# Patient Record
Sex: FEMALE | Race: WHITE | NOT HISPANIC OR LATINO | URBAN - METROPOLITAN AREA
[De-identification: names, ages, dates, MRNs, and addresses within clinical notes are randomized per-mention and may not be internally consistent; named-entity substitution may affect disease eponyms.]

---

## 2017-07-03 ENCOUNTER — CONVERSION ENCOUNTER (OUTPATIENT)
Dept: GENERAL RADIOLOGY | Facility: HOSPITAL | Age: 67
End: 2017-07-03

## 2018-01-04 ENCOUNTER — OFFICE VISIT CONVERTED (OUTPATIENT)
Dept: FAMILY MEDICINE CLINIC | Facility: CLINIC | Age: 68
End: 2018-01-04
Attending: NURSE PRACTITIONER

## 2018-02-22 ENCOUNTER — OFFICE VISIT CONVERTED (OUTPATIENT)
Dept: FAMILY MEDICINE CLINIC | Facility: CLINIC | Age: 68
End: 2018-02-22
Attending: NURSE PRACTITIONER

## 2018-04-10 ENCOUNTER — CONVERSION ENCOUNTER (OUTPATIENT)
Dept: FAMILY MEDICINE CLINIC | Facility: CLINIC | Age: 68
End: 2018-04-10

## 2018-04-10 ENCOUNTER — OFFICE VISIT CONVERTED (OUTPATIENT)
Dept: FAMILY MEDICINE CLINIC | Facility: CLINIC | Age: 68
End: 2018-04-10
Attending: NURSE PRACTITIONER

## 2018-04-25 ENCOUNTER — OFFICE VISIT CONVERTED (OUTPATIENT)
Dept: FAMILY MEDICINE CLINIC | Facility: CLINIC | Age: 68
End: 2018-04-25
Attending: NURSE PRACTITIONER

## 2018-05-09 ENCOUNTER — OFFICE VISIT CONVERTED (OUTPATIENT)
Dept: FAMILY MEDICINE CLINIC | Facility: CLINIC | Age: 68
End: 2018-05-09
Attending: NURSE PRACTITIONER

## 2018-06-22 ENCOUNTER — OFFICE VISIT CONVERTED (OUTPATIENT)
Dept: FAMILY MEDICINE CLINIC | Facility: CLINIC | Age: 68
End: 2018-06-22
Attending: NURSE PRACTITIONER

## 2018-08-08 ENCOUNTER — OFFICE VISIT CONVERTED (OUTPATIENT)
Dept: FAMILY MEDICINE CLINIC | Facility: CLINIC | Age: 68
End: 2018-08-08
Attending: NURSE PRACTITIONER

## 2018-08-14 ENCOUNTER — OFFICE VISIT CONVERTED (OUTPATIENT)
Dept: SURGERY | Facility: CLINIC | Age: 68
End: 2018-08-14
Attending: SURGERY

## 2018-08-29 ENCOUNTER — CONVERSION ENCOUNTER (OUTPATIENT)
Dept: GENERAL RADIOLOGY | Facility: HOSPITAL | Age: 68
End: 2018-08-29

## 2018-09-13 ENCOUNTER — OFFICE VISIT CONVERTED (OUTPATIENT)
Dept: SURGERY | Facility: CLINIC | Age: 68
End: 2018-09-13
Attending: SURGERY

## 2018-09-13 ENCOUNTER — CONVERSION ENCOUNTER (OUTPATIENT)
Dept: SURGERY | Facility: CLINIC | Age: 68
End: 2018-09-13

## 2018-09-19 ENCOUNTER — CONVERSION ENCOUNTER (OUTPATIENT)
Dept: FAMILY MEDICINE CLINIC | Facility: CLINIC | Age: 68
End: 2018-09-19

## 2018-09-19 ENCOUNTER — OFFICE VISIT CONVERTED (OUTPATIENT)
Dept: FAMILY MEDICINE CLINIC | Facility: CLINIC | Age: 68
End: 2018-09-19
Attending: NURSE PRACTITIONER

## 2018-10-04 ENCOUNTER — CONVERSION ENCOUNTER (OUTPATIENT)
Dept: SURGERY | Facility: CLINIC | Age: 68
End: 2018-10-04

## 2018-10-04 ENCOUNTER — OFFICE VISIT CONVERTED (OUTPATIENT)
Dept: SURGERY | Facility: CLINIC | Age: 68
End: 2018-10-04
Attending: SURGERY

## 2018-10-11 ENCOUNTER — OFFICE VISIT CONVERTED (OUTPATIENT)
Dept: SURGERY | Facility: CLINIC | Age: 68
End: 2018-10-11
Attending: SURGERY

## 2018-10-11 ENCOUNTER — CONVERSION ENCOUNTER (OUTPATIENT)
Dept: SURGERY | Facility: CLINIC | Age: 68
End: 2018-10-11

## 2018-11-12 ENCOUNTER — OFFICE VISIT CONVERTED (OUTPATIENT)
Dept: SURGERY | Facility: CLINIC | Age: 68
End: 2018-11-12
Attending: SURGERY

## 2018-11-12 ENCOUNTER — CONVERSION ENCOUNTER (OUTPATIENT)
Dept: SURGERY | Facility: CLINIC | Age: 68
End: 2018-11-12

## 2019-03-21 ENCOUNTER — OFFICE VISIT CONVERTED (OUTPATIENT)
Dept: SURGERY | Facility: CLINIC | Age: 69
End: 2019-03-21
Attending: SURGERY

## 2019-03-21 ENCOUNTER — CONVERSION ENCOUNTER (OUTPATIENT)
Dept: SURGERY | Facility: CLINIC | Age: 69
End: 2019-03-21

## 2019-09-26 ENCOUNTER — OFFICE VISIT CONVERTED (OUTPATIENT)
Dept: FAMILY MEDICINE CLINIC | Facility: CLINIC | Age: 69
End: 2019-09-26
Attending: NURSE PRACTITIONER

## 2019-10-15 ENCOUNTER — CONVERSION ENCOUNTER (OUTPATIENT)
Dept: FAMILY MEDICINE CLINIC | Facility: CLINIC | Age: 69
End: 2019-10-15

## 2019-10-15 ENCOUNTER — CONVERSION ENCOUNTER (OUTPATIENT)
Dept: SURGERY | Facility: CLINIC | Age: 69
End: 2019-10-15

## 2019-10-15 ENCOUNTER — OFFICE VISIT CONVERTED (OUTPATIENT)
Dept: SURGERY | Facility: CLINIC | Age: 69
End: 2019-10-15
Attending: SURGERY

## 2019-10-28 ENCOUNTER — HOSPITAL ENCOUNTER (OUTPATIENT)
Dept: GASTROENTEROLOGY | Facility: HOSPITAL | Age: 69
Setting detail: HOSPITAL OUTPATIENT SURGERY
Discharge: HOME | End: 2019-10-28
Attending: SURGERY

## 2019-10-28 LAB — GLUCOSE BLD-MCNC: 199 MG/DL (ref 65–99)

## 2019-11-11 ENCOUNTER — CONVERSION ENCOUNTER (OUTPATIENT)
Dept: SURGERY | Facility: CLINIC | Age: 69
End: 2019-11-11

## 2019-11-11 ENCOUNTER — OFFICE VISIT CONVERTED (OUTPATIENT)
Dept: SURGERY | Facility: CLINIC | Age: 69
End: 2019-11-11
Attending: NURSE PRACTITIONER

## 2019-11-12 ENCOUNTER — HOSPITAL ENCOUNTER (OUTPATIENT)
Dept: MAMMOGRAPHY | Facility: HOSPITAL | Age: 69
Discharge: HOME | End: 2019-11-12
Attending: NURSE PRACTITIONER

## 2020-02-06 ENCOUNTER — HOSPITAL ENCOUNTER (OUTPATIENT)
Dept: LAB | Facility: HOSPITAL | Age: 70
Discharge: HOME | End: 2020-02-06
Attending: NURSE PRACTITIONER

## 2020-02-06 LAB
25(OH)D3 SERPL-MCNC: 22.4 NG/ML (ref 30–100)
ALBUMIN SERPL-MCNC: 3.9 G/DL (ref 3.5–5)
ALBUMIN/GLOB SERPL: 1.3 {RATIO} (ref 1.4–2.6)
ALP SERPL-CCNC: 96 U/L (ref 43–160)
ALT SERPL-CCNC: 11 U/L (ref 10–40)
ANION GAP SERPL CALC-SCNC: 17 MMOL/L (ref 8–19)
APPEARANCE UR: CLEAR
AST SERPL-CCNC: 11 U/L (ref 15–50)
BASOPHILS # BLD AUTO: 0.04 10*3/UL (ref 0–0.2)
BASOPHILS NFR BLD AUTO: 0.5 % (ref 0–3)
BILIRUB SERPL-MCNC: 0.34 MG/DL (ref 0.2–1.3)
BILIRUB UR QL: NEGATIVE
BUN SERPL-MCNC: 29 MG/DL (ref 5–25)
BUN/CREAT SERPL: 24 {RATIO} (ref 6–20)
CALCIUM SERPL-MCNC: 9.4 MG/DL (ref 8.7–10.4)
CHLORIDE SERPL-SCNC: 110 MMOL/L (ref 99–111)
CHOLEST SERPL-MCNC: 156 MG/DL (ref 107–200)
CHOLEST/HDLC SERPL: 4.9 {RATIO} (ref 3–6)
COLOR UR: YELLOW
CONV ABS IMM GRAN: 0.02 10*3/UL (ref 0–0.2)
CONV BACTERIA: NEGATIVE
CONV CO2: 19 MMOL/L (ref 22–32)
CONV COLLECTION SOURCE (UA): ABNORMAL
CONV CREATININE URINE, RANDOM: 118 MG/DL (ref 10–300)
CONV HYALINE CASTS IN URINE MICRO: ABNORMAL /[LPF]
CONV IMMATURE GRAN: 0.3 % (ref 0–1.8)
CONV MICROALBUM.,U,RANDOM: 946.3 MG/L (ref 0–20)
CONV TOTAL PROTEIN: 6.8 G/DL (ref 6.3–8.2)
CONV UROBILINOGEN IN URINE BY AUTOMATED TEST STRIP: 1 {EHRLICHU}/DL (ref 0.1–1)
CREAT UR-MCNC: 1.2 MG/DL (ref 0.5–0.9)
DEPRECATED RDW RBC AUTO: 45.1 FL (ref 36.4–46.3)
EOSINOPHIL # BLD AUTO: 0.37 10*3/UL (ref 0–0.7)
EOSINOPHIL # BLD AUTO: 5.1 % (ref 0–7)
ERYTHROCYTE [DISTWIDTH] IN BLOOD BY AUTOMATED COUNT: 14.3 % (ref 11.7–14.4)
EST. AVERAGE GLUCOSE BLD GHB EST-MCNC: 260 MG/DL
GFR SERPLBLD BASED ON 1.73 SQ M-ARVRAT: 45 ML/MIN/{1.73_M2}
GLOBULIN UR ELPH-MCNC: 2.9 G/DL (ref 2–3.5)
GLUCOSE SERPL-MCNC: 125 MG/DL (ref 65–99)
GLUCOSE UR QL: NEGATIVE MG/DL
HBA1C MFR BLD: 10.7 % (ref 3.5–5.7)
HCT VFR BLD AUTO: 37.5 % (ref 37–47)
HDLC SERPL-MCNC: 32 MG/DL (ref 40–60)
HGB BLD-MCNC: 11.7 G/DL (ref 12–16)
HGB UR QL STRIP: NEGATIVE
KETONES UR QL STRIP: NEGATIVE MG/DL
LDLC SERPL CALC-MCNC: 90 MG/DL (ref 70–100)
LEUKOCYTE ESTERASE UR QL STRIP: ABNORMAL
LYMPHOCYTES # BLD AUTO: 1.66 10*3/UL (ref 1–5)
LYMPHOCYTES NFR BLD AUTO: 22.7 % (ref 20–45)
MCH RBC QN AUTO: 27 PG (ref 27–31)
MCHC RBC AUTO-ENTMCNC: 31.2 G/DL (ref 33–37)
MCV RBC AUTO: 86.6 FL (ref 81–99)
MICROALBUMIN/CREAT UR: 801.9 MG/G{CRE} (ref 0–35)
MONOCYTES # BLD AUTO: 0.57 10*3/UL (ref 0.2–1.2)
MONOCYTES NFR BLD AUTO: 7.8 % (ref 3–10)
NEUTROPHILS # BLD AUTO: 4.66 10*3/UL (ref 2–8)
NEUTROPHILS NFR BLD AUTO: 63.6 % (ref 30–85)
NITRITE UR QL STRIP: NEGATIVE
NRBC CBCN: 0 % (ref 0–0.7)
OSMOLALITY SERPL CALC.SUM OF ELEC: 299 MOSM/KG (ref 273–304)
PH UR STRIP.AUTO: 5 [PH] (ref 5–8)
PLATELET # BLD AUTO: 200 10*3/UL (ref 130–400)
PMV BLD AUTO: 12.3 FL (ref 9.4–12.3)
POTASSIUM SERPL-SCNC: 4.7 MMOL/L (ref 3.5–5.3)
PROT UR QL: 30 MG/DL
RBC # BLD AUTO: 4.33 10*6/UL (ref 4.2–5.4)
RBC #/AREA URNS HPF: ABNORMAL /[HPF]
SODIUM SERPL-SCNC: 141 MMOL/L (ref 135–147)
SP GR UR: 1.02 (ref 1–1.03)
SQUAMOUS SPT QL MICRO: ABNORMAL /[HPF]
T4 FREE SERPL-MCNC: 1.2 NG/DL (ref 0.9–1.8)
TRIGL SERPL-MCNC: 171 MG/DL (ref 40–150)
TSH SERPL-ACNC: 0.76 M[IU]/L (ref 0.27–4.2)
VLDLC SERPL-MCNC: 34 MG/DL (ref 5–37)
WBC # BLD AUTO: 7.32 10*3/UL (ref 4.8–10.8)
WBC #/AREA URNS HPF: ABNORMAL /[HPF]

## 2020-02-09 LAB — BACTERIA UR CULT: NORMAL

## 2020-02-11 ENCOUNTER — OFFICE VISIT CONVERTED (OUTPATIENT)
Dept: FAMILY MEDICINE CLINIC | Facility: CLINIC | Age: 70
End: 2020-02-11
Attending: NURSE PRACTITIONER

## 2020-02-11 ENCOUNTER — CONVERSION ENCOUNTER (OUTPATIENT)
Dept: FAMILY MEDICINE CLINIC | Facility: CLINIC | Age: 70
End: 2020-02-11

## 2020-05-20 ENCOUNTER — HOSPITAL ENCOUNTER (OUTPATIENT)
Dept: LAB | Facility: HOSPITAL | Age: 70
Discharge: HOME | End: 2020-05-20
Attending: SPECIALIST

## 2020-05-20 LAB
25(OH)D3 SERPL-MCNC: 20.1 NG/ML (ref 30–100)
ALBUMIN SERPL-MCNC: 4.1 G/DL (ref 3.5–5)
ALBUMIN/GLOB SERPL: 1.4 {RATIO} (ref 1.4–2.6)
ALP SERPL-CCNC: 109 U/L (ref 43–160)
ALT SERPL-CCNC: 13 U/L (ref 10–40)
ANION GAP SERPL CALC-SCNC: 20 MMOL/L (ref 8–19)
APPEARANCE UR: CLEAR
AST SERPL-CCNC: 14 U/L (ref 15–50)
BASOPHILS # BLD AUTO: 0.06 10*3/UL (ref 0–0.2)
BASOPHILS NFR BLD AUTO: 0.8 % (ref 0–3)
BILIRUB SERPL-MCNC: 0.5 MG/DL (ref 0.2–1.3)
BILIRUB UR QL: NEGATIVE
BUN SERPL-MCNC: 25 MG/DL (ref 5–25)
BUN/CREAT SERPL: 19 {RATIO} (ref 6–20)
CALCIUM SERPL-MCNC: 9.5 MG/DL (ref 8.7–10.4)
CHLORIDE SERPL-SCNC: 106 MMOL/L (ref 99–111)
CHOLEST SERPL-MCNC: 152 MG/DL (ref 107–200)
CHOLEST/HDLC SERPL: 4 {RATIO} (ref 3–6)
COLOR UR: YELLOW
CONV ABS IMM GRAN: 0.02 10*3/UL (ref 0–0.2)
CONV BACTERIA: NEGATIVE
CONV CO2: 18 MMOL/L (ref 22–32)
CONV COLLECTION SOURCE (UA): ABNORMAL
CONV CREATININE URINE, RANDOM: 73.7 MG/DL (ref 10–300)
CONV IMMATURE GRAN: 0.3 % (ref 0–1.8)
CONV MICROALBUM.,U,RANDOM: 165.5 MG/L (ref 0–20)
CONV TOTAL PROTEIN: 7.1 G/DL (ref 6.3–8.2)
CONV UROBILINOGEN IN URINE BY AUTOMATED TEST STRIP: 1 {EHRLICHU}/DL (ref 0.1–1)
CREAT UR-MCNC: 1.35 MG/DL (ref 0.5–0.9)
DEPRECATED RDW RBC AUTO: 45.6 FL (ref 36.4–46.3)
EOSINOPHIL # BLD AUTO: 0.44 10*3/UL (ref 0–0.7)
EOSINOPHIL # BLD AUTO: 5.9 % (ref 0–7)
ERYTHROCYTE [DISTWIDTH] IN BLOOD BY AUTOMATED COUNT: 14.4 % (ref 11.7–14.4)
EST. AVERAGE GLUCOSE BLD GHB EST-MCNC: 209 MG/DL
GFR SERPLBLD BASED ON 1.73 SQ M-ARVRAT: 39 ML/MIN/{1.73_M2}
GLOBULIN UR ELPH-MCNC: 3 G/DL (ref 2–3.5)
GLUCOSE SERPL-MCNC: 181 MG/DL (ref 65–99)
GLUCOSE UR QL: NEGATIVE MG/DL
HBA1C MFR BLD: 8.9 % (ref 3.5–5.7)
HCT VFR BLD AUTO: 39.9 % (ref 37–47)
HDLC SERPL-MCNC: 38 MG/DL (ref 40–60)
HGB BLD-MCNC: 12.4 G/DL (ref 12–16)
HGB UR QL STRIP: NEGATIVE
KETONES UR QL STRIP: NEGATIVE MG/DL
LDLC SERPL CALC-MCNC: 57 MG/DL (ref 70–100)
LEUKOCYTE ESTERASE UR QL STRIP: NEGATIVE
LYMPHOCYTES # BLD AUTO: 1.67 10*3/UL (ref 1–5)
LYMPHOCYTES NFR BLD AUTO: 22.5 % (ref 20–45)
MCH RBC QN AUTO: 27.1 PG (ref 27–31)
MCHC RBC AUTO-ENTMCNC: 31.1 G/DL (ref 33–37)
MCV RBC AUTO: 87.1 FL (ref 81–99)
MICROALBUMIN/CREAT UR: 224.6 MG/G{CRE} (ref 0–35)
MONOCYTES # BLD AUTO: 0.48 10*3/UL (ref 0.2–1.2)
MONOCYTES NFR BLD AUTO: 6.5 % (ref 3–10)
NEUTROPHILS # BLD AUTO: 4.76 10*3/UL (ref 2–8)
NEUTROPHILS NFR BLD AUTO: 64 % (ref 30–85)
NITRITE UR QL STRIP: NEGATIVE
NRBC CBCN: 0 % (ref 0–0.7)
OSMOLALITY SERPL CALC.SUM OF ELEC: 297 MOSM/KG (ref 273–304)
PH UR STRIP.AUTO: 5 [PH] (ref 5–8)
PLATELET # BLD AUTO: 202 10*3/UL (ref 130–400)
PMV BLD AUTO: 11.8 FL (ref 9.4–12.3)
POTASSIUM SERPL-SCNC: 5.4 MMOL/L (ref 3.5–5.3)
PROT UR QL: 30 MG/DL
RBC # BLD AUTO: 4.58 10*6/UL (ref 4.2–5.4)
RBC #/AREA URNS HPF: ABNORMAL /[HPF]
SODIUM SERPL-SCNC: 139 MMOL/L (ref 135–147)
SP GR UR: 1.01 (ref 1–1.03)
T4 FREE SERPL-MCNC: 1.3 NG/DL (ref 0.9–1.8)
TRIGL SERPL-MCNC: 287 MG/DL (ref 40–150)
TSH SERPL-ACNC: 0.26 M[IU]/L (ref 0.27–4.2)
VLDLC SERPL-MCNC: 57 MG/DL (ref 5–37)
WBC # BLD AUTO: 7.43 10*3/UL (ref 4.8–10.8)
WBC #/AREA URNS HPF: ABNORMAL /[HPF]

## 2020-06-02 ENCOUNTER — OFFICE VISIT CONVERTED (OUTPATIENT)
Dept: FAMILY MEDICINE CLINIC | Facility: CLINIC | Age: 70
End: 2020-06-02
Attending: NURSE PRACTITIONER

## 2020-06-02 ENCOUNTER — HOSPITAL ENCOUNTER (OUTPATIENT)
Dept: FAMILY MEDICINE CLINIC | Facility: CLINIC | Age: 70
Discharge: HOME | End: 2020-06-02
Attending: NURSE PRACTITIONER

## 2020-06-02 ENCOUNTER — CONVERSION ENCOUNTER (OUTPATIENT)
Dept: FAMILY MEDICINE CLINIC | Facility: CLINIC | Age: 70
End: 2020-06-02

## 2020-06-03 LAB
ANION GAP SERPL CALC-SCNC: 17 MMOL/L (ref 8–19)
BUN SERPL-MCNC: 24 MG/DL (ref 5–25)
BUN/CREAT SERPL: 17 {RATIO} (ref 6–20)
CALCIUM SERPL-MCNC: 9.2 MG/DL (ref 8.7–10.4)
CHLORIDE SERPL-SCNC: 105 MMOL/L (ref 99–111)
CONV CO2: 20 MMOL/L (ref 22–32)
CREAT UR-MCNC: 1.41 MG/DL (ref 0.5–0.9)
GFR SERPLBLD BASED ON 1.73 SQ M-ARVRAT: 37 ML/MIN/{1.73_M2}
GLUCOSE SERPL-MCNC: 323 MG/DL (ref 65–99)
OSMOLALITY SERPL CALC.SUM OF ELEC: 297 MOSM/KG (ref 273–304)
POTASSIUM SERPL-SCNC: 6.5 MMOL/L (ref 3.5–5.3)
SODIUM SERPL-SCNC: 135 MMOL/L (ref 135–147)
T4 FREE SERPL-MCNC: 1.5 NG/DL (ref 0.9–1.8)
TSH SERPL-ACNC: 0.25 M[IU]/L (ref 0.27–4.2)

## 2020-06-05 ENCOUNTER — HOSPITAL ENCOUNTER (OUTPATIENT)
Dept: LAB | Facility: HOSPITAL | Age: 70
Discharge: HOME | End: 2020-06-05
Attending: NURSE PRACTITIONER

## 2020-06-05 LAB
ANION GAP SERPL CALC-SCNC: 19 MMOL/L (ref 8–19)
BUN SERPL-MCNC: 21 MG/DL (ref 5–25)
BUN/CREAT SERPL: 17 {RATIO} (ref 6–20)
CALCIUM SERPL-MCNC: 9.4 MG/DL (ref 8.7–10.4)
CHLORIDE SERPL-SCNC: 104 MMOL/L (ref 99–111)
CONV CO2: 20 MMOL/L (ref 22–32)
CREAT UR-MCNC: 1.22 MG/DL (ref 0.5–0.9)
GFR SERPLBLD BASED ON 1.73 SQ M-ARVRAT: 44 ML/MIN/{1.73_M2}
GLUCOSE SERPL-MCNC: 217 MG/DL (ref 65–99)
OSMOLALITY SERPL CALC.SUM OF ELEC: 294 MOSM/KG (ref 273–304)
POTASSIUM SERPL-SCNC: 6.2 MMOL/L (ref 3.5–5.3)
SODIUM SERPL-SCNC: 137 MMOL/L (ref 135–147)

## 2020-06-12 ENCOUNTER — HOSPITAL ENCOUNTER (OUTPATIENT)
Dept: FAMILY MEDICINE CLINIC | Facility: CLINIC | Age: 70
Discharge: HOME | End: 2020-06-12
Attending: NURSE PRACTITIONER

## 2020-06-12 LAB
ANION GAP SERPL CALC-SCNC: 17 MMOL/L (ref 8–19)
BUN SERPL-MCNC: 30 MG/DL (ref 5–25)
BUN/CREAT SERPL: 19 {RATIO} (ref 6–20)
CALCIUM SERPL-MCNC: 9.4 MG/DL (ref 8.7–10.4)
CHLORIDE SERPL-SCNC: 100 MMOL/L (ref 99–111)
CONV CO2: 24 MMOL/L (ref 22–32)
CREAT UR-MCNC: 1.54 MG/DL (ref 0.5–0.9)
GFR SERPLBLD BASED ON 1.73 SQ M-ARVRAT: 33 ML/MIN/{1.73_M2}
GLUCOSE SERPL-MCNC: 199 MG/DL (ref 65–99)
OSMOLALITY SERPL CALC.SUM OF ELEC: 292 MOSM/KG (ref 273–304)
POTASSIUM SERPL-SCNC: 5.5 MMOL/L (ref 3.5–5.3)
SODIUM SERPL-SCNC: 135 MMOL/L (ref 135–147)

## 2020-06-17 ENCOUNTER — HOSPITAL ENCOUNTER (OUTPATIENT)
Dept: FAMILY MEDICINE CLINIC | Facility: CLINIC | Age: 70
Discharge: HOME | End: 2020-06-17
Attending: NURSE PRACTITIONER

## 2020-06-17 ENCOUNTER — OFFICE VISIT CONVERTED (OUTPATIENT)
Dept: FAMILY MEDICINE CLINIC | Facility: CLINIC | Age: 70
End: 2020-06-17
Attending: NURSE PRACTITIONER

## 2020-06-17 LAB
ANION GAP SERPL CALC-SCNC: 21 MMOL/L (ref 8–19)
BUN SERPL-MCNC: 50 MG/DL (ref 5–25)
BUN/CREAT SERPL: 29 {RATIO} (ref 6–20)
CALCIUM SERPL-MCNC: 9.4 MG/DL (ref 8.7–10.4)
CHLORIDE SERPL-SCNC: 99 MMOL/L (ref 99–111)
CONV CO2: 22 MMOL/L (ref 22–32)
CREAT UR-MCNC: 1.73 MG/DL (ref 0.5–0.9)
GFR SERPLBLD BASED ON 1.73 SQ M-ARVRAT: 29 ML/MIN/{1.73_M2}
GLUCOSE SERPL-MCNC: 216 MG/DL (ref 65–99)
OSMOLALITY SERPL CALC.SUM OF ELEC: 304 MOSM/KG (ref 273–304)
POTASSIUM SERPL-SCNC: 4.8 MMOL/L (ref 3.5–5.3)
SODIUM SERPL-SCNC: 137 MMOL/L (ref 135–147)

## 2020-07-01 ENCOUNTER — TELEPHONE CONVERTED (OUTPATIENT)
Dept: FAMILY MEDICINE CLINIC | Facility: CLINIC | Age: 70
End: 2020-07-01
Attending: NURSE PRACTITIONER

## 2020-07-16 ENCOUNTER — HOSPITAL ENCOUNTER (OUTPATIENT)
Dept: LAB | Facility: HOSPITAL | Age: 70
Discharge: HOME | End: 2020-07-16
Attending: INTERNAL MEDICINE

## 2020-07-20 LAB
CONV IMMUNOGLOBULIN G (IGG): 974 MG/DL (ref 586–1602)
CONV IMMUNOGLOBULIN M (IGM): 90 MG/DL (ref 26–217)
IGA SERPL-MCNC: 216 MG/DL (ref 64–422)
PROT PATTERN SERPL IFE-IMP: NORMAL

## 2020-07-21 LAB
ALBUMIN 24H MFR UR ELPH: 70.5 %
ALPHA1 GLOB 24H MFR UR ELPH: 3.3 %
ALPHA2 GLOB 24H MFR UR ELPH: 6.1 %
B-GLOBULIN MFR UR ELPH: 12.2 %
CONV IMMUNOFIXATION (URINE): NORMAL
CONV PE NOTE: NORMAL
GAMMA GLOB 24H MFR UR ELPH: 8 %
M PROTEIN MFR UR ELPH: NORMAL %
PROT UR-MCNC: 8.1 MG/DL

## 2020-08-06 ENCOUNTER — HOSPITAL ENCOUNTER (OUTPATIENT)
Dept: GENERAL RADIOLOGY | Facility: HOSPITAL | Age: 70
Discharge: HOME | End: 2020-08-06
Attending: INTERNAL MEDICINE

## 2021-05-13 NOTE — PROGRESS NOTES
Progress Note      Patient Name: Lillian Zaman   Patient ID: [id number]   Sex: Female   YOB: 1950    Primary Care Provider: Melissa HAY   Referring Provider: Melissa HAY    Visit Date: July 1, 2020    Provider: SATNAM Peace   Location: [facility]   Location Address: [address] [place] [address]  [id number]   Location Phone: [phone]          Chief Complaint  · 2 week follow up HTN, DM2, hyperkalemia, and CKD       History Of Present Illness  TELEHEALTH TELEPHONE VISIT  Chief Complaint: elevated potassium   Lillian Zaman is a 69 year old /White female who is presenting for evaluation via telehealth telephone visit. Verbal consent obtained before beginning visit.   Provider spent 12 minutes with the patient during the telehealth visit.   The following staff were present during this visit: Sruthi Gupta MA   Past Medical History/ Overview of Patient Symptoms  Lillian Zaman is a 69 year old /White female who presents for evaluation and treatment of:      2 week follow up HTN, DM2, hyperkalemia, and CKD     C/O change in medication is working  Checking BS BID Morning range 104-111 fasting   After meals check 104-232 (pizza that night)  10units am and 74 units qpm    Patient had an apt for [facility] and they had cancelled due to COVID  Nephrology apt 7/16  Eye exam is due in 9/20           Past Medical History  Disease Name Date Onset Notes   Allergic Rhinitis --  --    Broken Bones --  --    CKD (chronic kidney disease) stage 3, GFR 30-59 ml/min 10/18/2016 --    Colon cancer --  --    Diabetes --  --    Edema 10/18/2016 --    HTN (hypertension) --  --    Hyperlipidemia --  --    Hypothyroidism 10/18/2016 --    Iron deficiency anemia, unspecified iron deficiency anemia type 10/18/2016 --    Migraine headache --  --    Personal history of cerebrovascular accident with current residual effects  10/18/2016 --    RLS (restless legs syndrome) 11/02/2016 --    Sinus trouble --  --    Thyroid Problems --  --    TIA (transient ischemic attack) --  --    Vitamin D deficiency 10/18/2016 --          Past Surgical History  Procedure Name Date Notes   Cholecystecomy 1984 --    Colectomy, open --  --    Colonoscopy and EGD, with anesthesia --  --    Cyst Removal 1984 kidney   Exploratory laparotomy --  --    Fracture Repair 2003 --    Hysterectomy 1984 --    Lumpectomy --  --    Thyroidectomy-partial 1989 --    umbilical hernia repair --  --          Medication List  Name Date Started Instructions   Accu-Chek Softclix Lancets miscellaneous misc 12/01/2017 USE TO TEST BLOOD SUGAR TWICE DAILY   Advil 100 mg oral tablet  take 2 tablets (200 mg) by oral route every 6 hours as needed with food   albuterol sulfate 90 mcg/actuation inhalation HFA aerosol inhaler 06/02/2020 inhale 1 puff by inhalation route every 4 hours   amlodipine 10 mg oral tablet 06/02/2020 take 1 tablet by oral route once a day (at bedtime) for 30 days for 90 days   ascorbic acid (vitamin C) 500 mg oral tablet 02/11/2020 take 1 tablet by oral route 2 times a day for 90 days   atenolol 50 mg oral tablet 06/10/2020 take 0.5 tablet by oral route daily for 90 days   Calcium 500 + D 500 mg(1,250mg) -200 unit oral tablet 05/13/2020 take 1 tablet by oral route 2 times a day for 90 days   Contour Test Strips miscellaneous strip 01/04/2018 Test blood sugar BID   Contour Test Strips miscellaneous strip 02/25/2020 Test blood sugar BID   Easy Comfort Insulin Syringe 1 mL 31 gauge x 5/16 miscellaneous syringe 12/19/2019 USE AS DIRECTED TO GIVE LANTUS INJECTIONS   Easy Touch Insulin Syringe 1 mL 31 gauge x 5/16 miscellaneous syringe 12/19/2019 USE TO GIVE INSULIN INJECTIONS DAILY   Excedrin Migraine 250-250-65 mg oral tablet  take 1-2 tablets by oral route As needed   ezetimibe 10 mg oral tablet 06/02/2020 TAKE ONE TABLET BY MOUTH EVERY DAY   ferrous sulfate 325 mg  (65 mg iron) oral tablet 2020 take 1 tablet by oral route 2 times a day for 90 days   fluticasone propionate 50 mcg/actuation nasal spray,suspension 2020 inhale 2 sprays (100 mcg) in each nostril by intranasal route once daily for 30 days   Lantus U-100 Insulin 100 unit/mL subcutaneous solution 2020 10 units QAM and 74 unites QPM inject by subcutaneous route for 30 days   Lasix 40 mg oral tablet 2020 take 1 tablet (40 mg) by oral route 2 times per day for 30 days   levothyroxine 125 mcg oral tablet 2020 take 1 tablet (125 mcg) by oral route once daily for 90 days   lisinopril 20 mg oral tablet 2020 ONE TABLET PO DAILY   omeprazole 40 mg oral capsule,delayed release(DR/EC) 2020 take 1 capsule (40 mg) by oral route once daily before a meal for 90 days   Ozempic 0.25 mg or 0.5 mg(2 mg/1.5 mL) subcutaneous pen injector 2020 0.5 mg SQ Q WEEK   simvastatin 40 mg oral tablet 2020 one tablet po qhs   Tylenol Extra Strength 500 mg oral tablet 2018 take 2 tablets (1,000 mg) by oral route every 4-6 hours as needed not to exceed 8 tablets per 24hrs   Vitamin D2 1,250 mcg (50,000 unit) oral capsule 2020 take 1 capsule by oral route once a week for 90 days         Allergy List  Allergen Name Date Reaction Notes   Codeine Sulfate --  --  --    Keflex --  --  --    Phenergan --  --  --          Reproductive History  Menstrual   Pregnancy Summary   Total Pregnancies: 4 Full Term: 0 Premature: 0   Ab Induced: 0 Ab Spontaneous: 0 Ectopics: 0   Multiples: 0 Livin         Social History  Finding Status Start/Stop Quantity Notes   Alcohol Never --/-- --  --    No known infection risk --  --/-- --  --    Tobacco Never --/-- --  --          Immunizations  NameDate Admin Mfg Trade Name Lot Number Route Inj VIS Given VIS Publication   [id number]/ [facility] FLUZONE-HIGH DOSE [id number] IM RA 10/18/2016 2014   Comments:    Prevnar 131 WAL PREVNAR 13 [id number]  LA 10/18/2016 11/05/2015   Comments:          Review of Systems  · Constitutional  o Denies  o : fatigue  · Eyes  o Denies  o : blurred vision, changes in vision  · HENT  o Denies  o : headaches  · Cardiovascular  o Denies  o : chest pain, irregular heart beats, rapid heart rate, dyspnea on exertion  · Respiratory  o Denies  o : shortness of breath, wheezing, cough  · Gastrointestinal  o Denies  o : nausea, vomiting, diarrhea, constipation, abdominal pain, blood in stools, melena  · Genitourinary  o Denies  o : frequency, dysuria, hematuria  · Integument  o Denies  o : rash, new skin lesions  · Musculoskeletal  o Denies  o : joint pain, joint swelling, muscle pain  · Endocrine  o Admits  o : central obesity  o Denies  o : polyuria, polydipsia          Assessment  · Type 2 diabetes mellitus with other specified complication, without long-term current use of insulin     250.80/E11.69  continue current dose. follow up in 2 months. increase exercise  · CKD (chronic kidney disease) stage 3, GFR 30-59 ml/min     585.3/N18.3  · HTN (hypertension)     401.9/I10  · Hyperkalemia     276.7/E87.5      Plan  · Orders  o Physician Telephone Evaluation, 11-20 minutes ([id number]) - - 07/01/2020  o ACO-39: Current medications updated and reviewed () - - 07/01/2020  · Instructions  o Patient was educated/instructed on their diagnosis, treatment and medications prior to discharge from the clinic today.  · Disposition  o obtain labs prior to follow up appt  o Follow up in office in 2 months            Electronically Signed by: SATNAM Peace -Author on July 1, 2020 02:39:36 PM

## 2021-05-13 NOTE — PROGRESS NOTES
"   Progress Note      Patient Name: Lillian Zaman   Patient ID: [id number]   Sex: Female   YOB: 1950    Primary Care Provider: Melissa HAY   Referring Provider: Melissa HAY    Visit Date: June 17, 2020    Provider: SATNAM Peace   Location: [facility]   Location Address: [address] [place] [address]  [id number]   Location Phone: [phone]          Chief Complaint  · 2 week follow up HTN, DM2, hyperkalemia, and CKD       History Of Present Illness  Lillian Zaman is a 69 year old /White female who presents for evaluation and treatment of:      2 week follow-up on HTN, DM2, hyperkalemia and CKD    Lantus 74 units in PM and 10 units in AM  increase ozempic to 0.5 sq weekly   Fasting blood sugars are   Non fasting blood sugars have been as high as 394 when eating biscuits and watermelon  states she is trying and its hard to get back to eating healthy   \" I know what I am doing wrong\"     Nephrology is scheduled for July  Taking Lasix BID           Past Medical History  Disease Name Date Onset Notes   Allergic Rhinitis --  --    Broken Bones --  --    CKD (chronic kidney disease) stage 3, GFR 30-59 ml/min 10/18/2016 --    Colon cancer --  --    Diabetes --  --    Edema 10/18/2016 --    HTN (hypertension) --  --    Hyperlipidemia --  --    Hypothyroidism 10/18/2016 --    Iron deficiency anemia, unspecified iron deficiency anemia type 10/18/2016 --    Migraine headache --  --    Personal history of cerebrovascular accident with current residual effects 10/18/2016 --    RLS (restless legs syndrome) 11/02/2016 --    Sinus trouble --  --    Thyroid Problems --  --    TIA (transient ischemic attack) --  --    Vitamin D deficiency 10/18/2016 --          Past Surgical History  Procedure Name Date Notes   Cholecystecomy 1984 --    Colectomy, open --  --    Colonoscopy and EGD, with anesthesia --  --    Cyst Removal 1984 " kidney   Exploratory laparotomy --  --    Fracture Repair 2003 --    Hysterectomy 1984 --    Lumpectomy --  --    Thyroidectomy-partial 1989 --    umbilical hernia repair --  --          Medication List  Name Date Started Instructions   Accu-Chek Softclix Lancets miscellaneous misc 12/01/2017 USE TO TEST BLOOD SUGAR TWICE DAILY   Advil 100 mg oral tablet  take 2 tablets (200 mg) by oral route every 6 hours as needed with food   albuterol sulfate 90 mcg/actuation inhalation HFA aerosol inhaler 06/02/2020 inhale 1 puff by inhalation route every 4 hours   amlodipine 10 mg oral tablet 06/02/2020 take 1 tablet by oral route once a day (at bedtime) for 30 days for 90 days   ascorbic acid (vitamin C) 500 mg oral tablet 02/11/2020 take 1 tablet by oral route 2 times a day for 90 days   atenolol 50 mg oral tablet 06/10/2020 take 0.5 tablet by oral route daily for 90 days   Calcium 500 + D 500 mg(1,250mg) -200 unit oral tablet 05/13/2020 take 1 tablet by oral route 2 times a day for 90 days   Contour Test Strips miscellaneous strip 01/04/2018 Test blood sugar BID   Contour Test Strips miscellaneous strip 02/25/2020 Test blood sugar BID   Easy Comfort Insulin Syringe 1 mL 31 gauge x 5/16 miscellaneous syringe 12/19/2019 USE AS DIRECTED TO GIVE LANTUS INJECTIONS   Easy Touch Insulin Syringe 1 mL 31 gauge x 5/16 miscellaneous syringe 12/19/2019 USE TO GIVE INSULIN INJECTIONS DAILY   Excedrin Migraine 250-250-65 mg oral tablet  take 1-2 tablets by oral route As needed   ezetimibe 10 mg oral tablet 06/02/2020 TAKE ONE TABLET BY MOUTH EVERY DAY   ferrous sulfate 325 mg (65 mg iron) oral tablet 02/11/2020 take 1 tablet by oral route 2 times a day for 90 days   fluticasone propionate 50 mcg/actuation nasal spray,suspension 06/02/2020 inhale 2 sprays (100 mcg) in each nostril by intranasal route once daily for 30 days   Lantus U-100 Insulin 100 unit/mL subcutaneous solution 06/02/2020 10 units QAM and 74 unites QPM inject by  subcutaneous route for 30 days   Lasix 40 mg oral tablet 2020 take 1 tablet (40 mg) by oral route 2 times per day for 30 days   levothyroxine 125 mcg oral tablet 2020 take 1 tablet (125 mcg) by oral route once daily for 90 days   lisinopril 20 mg oral tablet 2020 ONE TABLET PO DAILY   omeprazole 40 mg oral capsule,delayed release(DR/EC) 2020 take 1 capsule (40 mg) by oral route once daily before a meal for 90 days   Ozempic 0.25 mg or 0.5 mg(2 mg/1.5 mL) subcutaneous pen injector 2020 0.5 mg SQ Q WEEK   simvastatin 40 mg oral tablet 2020 one tablet po qhs   Tylenol Extra Strength 500 mg oral tablet 2018 take 2 tablets (1,000 mg) by oral route every 4-6 hours as needed not to exceed 8 tablets per 24hrs   Vitamin D2 1,250 mcg (50,000 unit) oral capsule 2020 take 1 capsule by oral route once a week for 90 days         Allergy List  Allergen Name Date Reaction Notes   Codeine Sulfate --  --  --    Keflex --  --  --    Phenergan --  --  --          Reproductive History  Menstrual   Pregnancy Summary   Total Pregnancies: 4 Full Term: 0 Premature: 0   Ab Induced: 0 Ab Spontaneous: 0 Ectopics: 0   Multiples: 0 Livin         Social History  Finding Status Start/Stop Quantity Notes   Alcohol Never --/-- --  --    No known infection risk --  --/-- --  --    Tobacco Never --/-- --  --          Immunizations  NameDate Admin Mfg Trade Name Lot Number Route Inj VIS Given VIS Publication   [id number]/ [facility] FLUZONE-HIGH DOSE [id number] IM RA 10/18/2016 2014   Comments:    Prevnar  WAL PREVNAR 13 [id number] IM LA 10/18/2016 2015   Comments:          Review of Systems  · Constitutional  o Denies  o : fever  · Eyes  o Denies  o : blurred vision, changes in vision  · HENT  o Denies  o : headaches  · Cardiovascular  o Denies  o : chest pain  · Respiratory  o Denies  o : cough  · Integument  o Denies  o : rash, new skin lesions  · Musculoskeletal  o Denies  o :  "joint pain, joint swelling, muscle pain  · Endocrine  o Admits  o : central obesity  o Denies  o : polyuria, polydipsia      Vitals  Date Time BP Position Site L\R Cuff Size HR RR TEMP (F) WT  HT  BMI kg/m2 BSA m2 O2 Sat HC       06/02/2020 02:22 PM      59 - R  98.7 221lbs 0oz 5'  3\" 39.15 2.11 97 %    06/02/2020 03:10 /84 Sitting               06/17/2020 01:46 /58 Sitting    63 - R  97 218lbs 4oz 5'  3\" 38.66 2.1 98 %          Physical Examination  · Constitutional  o Appearance  o : well-nourished, in no acute distress  · Eyes  o Conjunctivae  o : conjunctivae normal  o Sclerae  o : sclerae white  o Pupils and Irises  o : pupils equal and round  o Eyelids/Ocular Adnexae  o : eyelid appearance normal, no exudates present  · Neck  o Inspection/Palpation  o : normal appearance, no masses or tenderness, trachea midline  · Respiratory  o Respiratory Effort  o : breathing unlabored  o Inspection of Chest  o : normal appearance  o Auscultation of Lungs  o : normal breath sounds throughout inspiration and expiration  · Cardiovascular  o Heart  o :   § Auscultation of Heart  § : regular rate and rhythm, no murmurs  o Peripheral Vascular System  o :   § Carotid Arteries  § : no bruits present  § Pedal Pulses  § : pulses 2 bilaterally  § Extremities  § : no clubbing or edema  · Skin and Subcutaneous Tissue  o General Inspection  o : pink, warm, dry   · Neurologic  o Mental Status Examination  o :   § Orientation  § : grossly oriented to person, place and time  o Gait and Station  o : normal gait, able to stand without difficulty  · Psychiatric  o Judgement and Insight  o : judgment and insight intact  o Mood and Affect  o : mood normal, affect appropriate          Assessment  · Type 2 diabetes mellitus with other specified complication, with long-term current use of insulin     250.80/E11.69  remains uncontrolled, continue current dose, decrease carb intake, increase exercise, will follow up in 2 weeks with " blood glucose log to determine if she requires further medication changes   · Essential hypertension     401.9/I10  currently controlled   · CKD (chronic kidney disease)     585.9/N18.9  stable keep appointment with nephrology   · Hyperkalemia     276.7/E87.5  will recheck today     Problems Reconciled  Plan  · Orders  o BMP [facility] ([id number]) - 401.9/I10 - 06/17/2020  o ACO-39: Current medications updated and reviewed () - - 06/17/2020  · Medications  o spironolactone 25 mg oral tablet   SIG: ONE TABLET PO DAILY   DISP: (90) tablets with 1 refills  Discontinued on 06/17/2020     o Medications have been Reconciled  o Transition of Care or Provider Policy  · Instructions  o Continue blood sugar monitoring daily and record. Bring your log to office visits. Call the office for readings below 70 and above 250 or any complications.  o Daily foot care. Avoid walking barefoot. Annual Dilated Eye Exam.  o Discussed with patient blood pressure monitoring, hemoglobin A1C levels need to be below 7.0, and LDL (Lipid) goals below 70.  o Patient was educated/instructed on their diagnosis, treatment and medications prior to discharge from the clinic today.  · Disposition  o follow up 2 weeks            Electronically Signed by: SATNAM Peace -Author on June 18, 2020 04:25:59 PM

## 2021-05-13 NOTE — PROGRESS NOTES
Progress Note      Patient Name: Lillian Zaman   Patient ID: [id number]   Sex: Female   YOB: 1950    Primary Care Provider: Melissa HAY   Referring Provider: Melissa HAY    Visit Date: June 2, 2020    Provider: SATNAM Peace   Location: [facility]   Location Address: [address] [place] [address]  [id number]   Location Phone: [phone]          Chief Complaint  · Follow up for DM2, HTN, hyperlipidemia, and vitamin D   · lab results       History Of Present Illness  Lillian Zaman is a 69 year old /White female who presents for evaluation and treatment of:      Follow up for DM2, HTN, hyperlipidemia, and vitamin D   LAB RESULTS     Fasting blood sugars average around 130  Last eye exam: September, Dr. Naylor  Last foot exam: June, Dr. Jacobo (ky foot and ankle)  A1C is 8.9  Lantus 74 units every night, Ozempic once a week  States she has been in her house a lot due to the quarantine  Her eating habits have not been very good  Has also been I the process of moving which has also thrown her eating regimen off    Nephrology is scheduled for July    Cardiologist claritza Bucio  Lowered spironolactone to 1/2 tablet due to increase in potassium on labs after holding for 2 days       Past Medical History  Disease Name Date Onset Notes   Allergic Rhinitis --  --    Broken Bones --  --    CKD (chronic kidney disease) stage 3, GFR 30-59 ml/min 10/18/2016 --    Colon cancer --  --    Diabetes --  --    Edema 10/18/2016 --    HTN (hypertension) --  --    Hyperlipidemia --  --    Hypothyroidism 10/18/2016 --    Iron deficiency anemia, unspecified iron deficiency anemia type 10/18/2016 --    Migraine headache --  --    Personal history of cerebrovascular accident with current residual effects 10/18/2016 --    RLS (restless legs syndrome) 11/02/2016 --    Sinus trouble --  --    Thyroid Problems --  --    TIA (transient ischemic  attack) --  --    Vitamin D deficiency 10/18/2016 --          Past Surgical History  Procedure Name Date Notes   Cholecystecomy 1984 --    Colectomy, open --  --    Colonoscopy and EGD, with anesthesia --  --    Cyst Removal 1984 kidney   Exploratory laparotomy --  --    Fracture Repair 2003 --    Hysterectomy 1984 --    Lumpectomy --  --    Thyroidectomy-partial 1989 --    umbilical hernia repair --  --          Medication List  Name Date Started Instructions   Accu-Chek Softclix Lancets miscellaneous misc 12/01/2017 USE TO TEST BLOOD SUGAR TWICE DAILY   Advil 100 mg oral tablet  take 2 tablets (200 mg) by oral route every 6 hours as needed with food   albuterol sulfate 90 mcg/actuation inhalation HFA aerosol inhaler 06/02/2020 inhale 1 puff by inhalation route every 4 hours   amlodipine 10 mg oral tablet 06/02/2020 take 1 tablet by oral route once a day (at bedtime) for 30 days for 90 days   ascorbic acid (vitamin C) 500 mg oral tablet 02/11/2020 take 1 tablet by oral route 2 times a day for 90 days   atenolol 25 mg oral tablet 02/11/2020 take 1 tablet (25 mg) by oral route once daily for 90 days   Calcium 500 + D 500 mg(1,250mg) -200 unit oral tablet 05/13/2020 take 1 tablet by oral route 2 times a day for 90 days   Contour Test Strips miscellaneous strip 01/04/2018 Test blood sugar BID   Contour Test Strips miscellaneous strip 02/25/2020 Test blood sugar BID   Easy Comfort Insulin Syringe 1 mL 31 gauge x 5/16 miscellaneous syringe 12/19/2019 USE AS DIRECTED TO GIVE LANTUS INJECTIONS   Easy Touch Insulin Syringe 1 mL 31 gauge x 5/16 miscellaneous syringe 12/19/2019 USE TO GIVE INSULIN INJECTIONS DAILY   Excedrin Migraine 250-250-65 mg oral tablet  take 1-2 tablets by oral route As needed   ezetimibe 10 mg oral tablet 06/02/2020 TAKE ONE TABLET BY MOUTH EVERY DAY   ferrous sulfate 325 mg (65 mg iron) oral tablet 02/11/2020 take 1 tablet by oral route 2 times a day for 90 days   fluticasone propionate 50  mcg/actuation nasal spray,suspension 2020 inhale 2 sprays (100 mcg) in each nostril by intranasal route once daily for 30 days   Lantus U-100 Insulin 100 unit/mL subcutaneous solution 2020 10 units QAM and 74 unites QPM inject by subcutaneous route for 30 days   levothyroxine 137 mcg oral tablet 2020 take 1 tablet (137 mcg) by oral route once daily for 90 days   lisinopril 40 mg oral tablet 2020 take 1 tablet (40 mg) by oral route once daily for 90 days for 30 days   omeprazole 40 mg oral capsule,delayed release(DR/EC) 2020 take 1 capsule (40 mg) by oral route once daily before a meal for 90 days   Ozempic 0.25 mg or 0.5 mg(2 mg/1.5 mL) subcutaneous pen injector 2020 0.5 mg SQ Q WEEK   simvastatin 40 mg oral tablet 2020 one tablet po qhs   spironolactone 25 mg oral tablet 2020 ONE TABLET PO DAILY   Tylenol Extra Strength 500 mg oral tablet 2018 take 2 tablets (1,000 mg) by oral route every 4-6 hours as needed not to exceed 8 tablets per 24hrs   Vitamin D2 1,250 mcg (50,000 unit) oral capsule 2020 take 1 capsule by oral route once a week for 90 days         Allergy List  Allergen Name Date Reaction Notes   Codeine Sulfate --  --  --    Keflex --  --  --    Phenergan --  --  --          Reproductive History  Menstrual   Pregnancy Summary   Total Pregnancies: 4 Full Term: 0 Premature: 0   Ab Induced: 0 Ab Spontaneous: 0 Ectopics: 0   Multiples: 0 Livin         Social History  Finding Status Start/Stop Quantity Notes   Alcohol Never --/-- --  --    No known infection risk --  --/-- --  --    Tobacco Never --/-- --  --          Immunizations  NameDate Admin Mfg Trade Name Lot Number Route Inj VIS Given VIS Publication   [id number]/ [facility] FLUZONE-HIGH DOSE [id number] IM RA 10/18/2016 2014   Comments:    Prevnar 131 WAL PREVNAR 13 [id number] IM LA 10/18/2016 2015   Comments:          Review of Systems  · Constitutional  o Denies  o :  "fatigue  · Eyes  o Denies  o : blurred vision, changes in vision  · HENT  o Denies  o : headaches  · Cardiovascular  o Denies  o : chest pain, irregular heart beats, rapid heart rate, dyspnea on exertion  · Respiratory  o Denies  o : shortness of breath, wheezing, cough  · Gastrointestinal  o Denies  o : nausea, vomiting, diarrhea, constipation, abdominal pain, blood in stools, melena  · Genitourinary  o Denies  o : frequency, dysuria, hematuria  · Integument  o Denies  o : rash, new skin lesions  · Musculoskeletal  o Denies  o : joint pain, joint swelling, muscle pain  · Endocrine  o Admits  o : central obesity  o Denies  o : polyuria, polydipsia      Vitals  Date Time BP Position Site L\R Cuff Size HR RR TEMP (F) WT  HT  BMI kg/m2 BSA m2 O2 Sat HC       02/11/2020 10:31 AM        97.4 212lbs 16oz 5'  3\" 37.73 2.07     02/11/2020 10:49 /46 Sitting               02/11/2020 11:06 /62 Sitting               06/02/2020 02:22 PM      59 - R  98.7 221lbs 0oz 5'  3\" 39.15 2.11 97 %    06/02/2020 03:10 /84 Sitting                     Physical Examination  · Constitutional  o Appearance  o : well-nourished, in no acute distress  · Eyes  o Conjunctivae  o : conjunctivae normal  o Sclerae  o : sclerae white  o Pupils and Irises  o : pupils equal and round  o Eyelids/Ocular Adnexae  o : eyelid appearance normal, no exudates present  · Ears, Nose, Mouth and Throat  o Ears  o :   § External Ears  § : external auditory canal appearance within normal limits, no discharge present  § Otoscopic Examination  § : tympanic membrane appearance within normal limits bilaterally, cerumen not present  o Nose  o :   § External Nose  § : appearance normal  § Intranasal Exam  § : mucosa within normal limits, vestibules normal, no intranasal lesions present, septum midline, sinuses non tender to palpation  § Nasopharynx  § : no discharge present  o Oral Cavity  o :   § Oral Mucosa  § : oral mucosa normal  § Lips  § : lip " appearance normal  § Teeth  § : normal dentation for age  o Throat  o :   § Oropharynx  § : no inflammation or lesions present, tonsils within normal limits  · Neck  o Inspection/Palpation  o : normal appearance, no masses or tenderness, trachea midline  o Range of Motion  o : cervical range of motion within normal limits  o Thyroid  o : gland size normal, nontender, no nodules or masses present on palpation  · Respiratory  o Respiratory Effort  o : breathing unlabored  o Inspection of Chest  o : normal appearance  o Auscultation of Lungs  o : normal breath sounds throughout inspiration and expiration  · Cardiovascular  o Heart  o :   § Auscultation of Heart  § : regular rate and rhythm, no murmurs, gallops or rubs  o Peripheral Vascular System  o :   § Carotid Arteries  § : normal pulses bilaterally, no bruits present  § Pedal Pulses  § : pulses 2 bilaterally  § Extremities  § : no clubbing or edema  · Gastrointestinal  o Abdominal Examination  o : abdomen nontender to palpation, tone normal without rigidity or guarding, no masses present, bowel sounds present in all four quadrants  · Lymphatic  o Neck  o : no lymphadenopathy present  · Musculoskeletal  o Spine  o :   § Inspection/Palpation  § : no spinal tenderness, scoliosis or kyphosis present  § Range of Motion  § : spine range of motion normal  o Right Upper Extremity  o :   § Inspection/Palpation  § : no tenderness to palpation, ROM normal  o Left Upper Extremity  o :   § Inspection/Palpation  § : no tenderness to palpation, ROM normal  o Right Lower Extremity  o :   § Inspection/Palpation  § : no joint or limb tenderness to palpation, ROM normal  o Left Lower Extremity  o :   § Inspection/Palpation  § : no joint or limb tenderness to palpation, ROM normal  · Skin and Subcutaneous Tissue  o General Inspection  o : no rashes or lesions present, no areas of discoloration  o Body Hair  o : hair normal for age, general body hair distribution normal for  age  o Digits and Nails  o : no clubbing, cyanosis, deformities or edema present, normal appearing nails  · Neurologic  o Mental Status Examination  o :   § Orientation  § : grossly oriented to person, place and time  o Gait and Station  o : normal gait, able to stand without difficulty  · Psychiatric  o Judgement and Insight  o : judgment and insight intact  o Mood and Affect  o : mood normal, affect appropriate              Assessment  · Type 2 diabetes mellitus with other specified complication, with long-term current use of insulin     250.80/E11.69  increase lantus dose to 74 units qhs and 10 units qam, increase ozempic 0.5 mg qweek, follow up in 2 weeks   · Essential hypertension     401.9/I10  elevated in clinic, decrease salt intake, increase water intake, increase exercise, weight loss, recheck in 2 weeks   · Hyperlipidemia     272.4/E78.5  stable on statin   · Hypothyroidism     244.9/E03.9  will recheck tsh today   · Vitamin D deficiency     268.9/E55.9  will increase supplementation to once weekly       Plan  · Orders  o Diabetes 2 Panel (Urine Microalbumin, CMP, Lipid, A1c, ) [facility] (68241, 25032, 34101, 34357) - - 09/02/2020  o CBC with Auto Diff [facility] (85631) - - 09/02/2020  o Urinalysis with Reflex Microscopy if abnormal ([facility]) (95396) - - 09/02/2020  o Thyroid Profile (62827, 03227, THYII) - - 09/02/2020  o OPHTHALMOLOGY CONSULTATION (OPHTH) - - 06/02/2020  o PODIATRY CONSULTATION (PODIA) - - 06/02/2020  o BMP [facility] (65260) - 401.9/I10 - 06/02/2020  o Thyroid Profile (84404, 08701, THYII) - 244.9/E03.9 - 06/02/2020  o Vitamin D Level (48506) - 268.9/E55.9 - 09/02/2020  o ACO-39: Current medications updated and reviewed () - - 06/02/2020  · Medications  o Lantus U-100 Insulin 100 unit/mL subcutaneous solution   SIG: 10 units QAM and 74 unites QPM inject by subcutaneous route for 30 days   DISP: (6) Milliliter with 2 refills  Adjusted on 06/02/2020     o Ozempic 0.25 mg or 0.5 mg(2 mg/1.5 mL)  subcutaneous pen injector   SI.5 mg SQ Q WEEK   DISP: (5) 1.5 ml syringe with 1 refills  Adjusted on 2020     o spironolactone 25 mg oral tablet   SIG: ONE TABLET PO DAILY   DISP: (90) tablets with 1 refills  Adjusted on 2020     o Vitamin D2 1,250 mcg (50,000 unit) oral capsule   SIG: take 1 capsule by oral route once a week for 90 days   DISP: (13) capsules with 1 refills  Adjusted on 2020     o albuterol sulfate 90 mcg/actuation inhalation HFA aerosol inhaler   SIG: inhale 1 puff by inhalation route every 4 hours   DISP: (1) 8.5 gm canister with 1 refills  Refilled on 2020     o amlodipine 10 mg oral tablet   SIG: take 1 tablet by oral route once a day (at bedtime) for 30 days for 90 days   DISP: (90) Tablet with 1 refills  Refilled on 2020     o ezetimibe 10 mg oral tablet   SIG: TAKE ONE TABLET BY MOUTH EVERY DAY   DISP: (90) Tablet with 1 refills  Refilled on 2020     o fluticasone propionate 50 mcg/actuation nasal spray,suspension   SIG: inhale 2 sprays (100 mcg) in each nostril by intranasal route once daily for 30 days   DISP: (1) 16 gm aer w/adap with 5 refills  Refilled on 2020     o lisinopril 40 mg oral tablet   SIG: take 1 tablet (40 mg) by oral route once daily for 90 days for 30 days   DISP: (90) Tablet with 1 refills  Refilled on 2020     o omeprazole 40 mg oral capsule,delayed release(DR/EC)   SIG: take 1 capsule (40 mg) by oral route once daily before a meal for 90 days   DISP: (90) Capsule with 1 refills  Refilled on 2020     o simvastatin 40 mg oral tablet   SIG: one tablet po qhs   DISP: (90) tablets with 1 refills  Refilled on 2020     o Tessalon Perles 100 mg oral capsule   SIG: take 1 capsule by oral route every 4 hours   DISP: (60) capsules with 0 refills  Discontinued on 2020     · Instructions  o Continue blood sugar monitoring daily and record. Bring your log to office visits. Call the office for readings below 70 and  above 250 or any complications.  o Daily foot care. Avoid walking barefoot. Annual Dilated Eye Exam.  o Discussed with patient blood pressure monitoring, hemoglobin A1C levels need to be below 7.0, and LDL (Lipid) goals below 70.  o Patient was educated/instructed on their diagnosis, treatment and medications prior to discharge from the clinic today.  · Disposition  o follow up 2 weeks            Electronically Signed by: SATNAM Peace -Author on June 2, 2020 03:11:30 PM

## 2021-05-15 VITALS — RESPIRATION RATE: 16 BRPM | WEIGHT: 229.25 LBS | HEIGHT: 63 IN | BODY MASS INDEX: 40.62 KG/M2

## 2021-05-15 VITALS — RESPIRATION RATE: 16 BRPM | HEIGHT: 63 IN | WEIGHT: 215 LBS | BODY MASS INDEX: 38.09 KG/M2

## 2021-05-15 VITALS — HEIGHT: 63 IN | BODY MASS INDEX: 37.92 KG/M2 | RESPIRATION RATE: 16 BRPM | WEIGHT: 214 LBS

## 2021-05-15 VITALS
DIASTOLIC BLOOD PRESSURE: 55 MMHG | BODY MASS INDEX: 37.92 KG/M2 | SYSTOLIC BLOOD PRESSURE: 152 MMHG | WEIGHT: 214 LBS | RESPIRATION RATE: 19 BRPM | TEMPERATURE: 97.6 F | OXYGEN SATURATION: 97 % | HEIGHT: 63 IN | HEART RATE: 58 BPM

## 2021-05-15 VITALS
SYSTOLIC BLOOD PRESSURE: 130 MMHG | WEIGHT: 218.25 LBS | TEMPERATURE: 97 F | DIASTOLIC BLOOD PRESSURE: 58 MMHG | HEART RATE: 63 BPM | HEIGHT: 63 IN | BODY MASS INDEX: 38.67 KG/M2 | OXYGEN SATURATION: 98 %

## 2021-05-15 VITALS
WEIGHT: 221 LBS | HEART RATE: 59 BPM | BODY MASS INDEX: 39.16 KG/M2 | TEMPERATURE: 98.7 F | HEIGHT: 63 IN | OXYGEN SATURATION: 97 %

## 2021-05-15 VITALS
WEIGHT: 213 LBS | DIASTOLIC BLOOD PRESSURE: 62 MMHG | HEIGHT: 63 IN | SYSTOLIC BLOOD PRESSURE: 153 MMHG | DIASTOLIC BLOOD PRESSURE: 46 MMHG | BODY MASS INDEX: 37.74 KG/M2 | TEMPERATURE: 97.4 F | SYSTOLIC BLOOD PRESSURE: 140 MMHG

## 2021-05-15 VITALS — DIASTOLIC BLOOD PRESSURE: 84 MMHG | SYSTOLIC BLOOD PRESSURE: 172 MMHG

## 2021-05-15 VITALS — SYSTOLIC BLOOD PRESSURE: 134 MMHG | DIASTOLIC BLOOD PRESSURE: 60 MMHG

## 2021-05-16 VITALS
BODY MASS INDEX: 38.8 KG/M2 | DIASTOLIC BLOOD PRESSURE: 57 MMHG | WEIGHT: 219 LBS | OXYGEN SATURATION: 97 % | RESPIRATION RATE: 16 BRPM | HEART RATE: 63 BPM | SYSTOLIC BLOOD PRESSURE: 151 MMHG | HEIGHT: 63 IN

## 2021-05-16 VITALS
RESPIRATION RATE: 16 BRPM | SYSTOLIC BLOOD PRESSURE: 149 MMHG | BODY MASS INDEX: 38.27 KG/M2 | WEIGHT: 216 LBS | HEIGHT: 63 IN | TEMPERATURE: 96.8 F | SYSTOLIC BLOOD PRESSURE: 154 MMHG | HEART RATE: 48 BPM | DIASTOLIC BLOOD PRESSURE: 80 MMHG | OXYGEN SATURATION: 97 % | DIASTOLIC BLOOD PRESSURE: 60 MMHG

## 2021-05-16 VITALS
HEART RATE: 63 BPM | TEMPERATURE: 97.3 F | SYSTOLIC BLOOD PRESSURE: 135 MMHG | HEIGHT: 63 IN | DIASTOLIC BLOOD PRESSURE: 81 MMHG | WEIGHT: 220 LBS | BODY MASS INDEX: 38.98 KG/M2 | OXYGEN SATURATION: 97 %

## 2021-05-16 VITALS — BODY MASS INDEX: 38.89 KG/M2 | WEIGHT: 219.5 LBS | RESPIRATION RATE: 16 BRPM | HEIGHT: 63 IN

## 2021-05-16 VITALS
HEIGHT: 63 IN | WEIGHT: 216 LBS | DIASTOLIC BLOOD PRESSURE: 62 MMHG | HEART RATE: 54 BPM | BODY MASS INDEX: 38.27 KG/M2 | TEMPERATURE: 96.9 F | OXYGEN SATURATION: 96 % | SYSTOLIC BLOOD PRESSURE: 153 MMHG

## 2021-05-16 VITALS — HEIGHT: 63 IN | WEIGHT: 219.31 LBS | RESPIRATION RATE: 18 BRPM | BODY MASS INDEX: 38.86 KG/M2

## 2021-05-16 VITALS
TEMPERATURE: 98.3 F | SYSTOLIC BLOOD PRESSURE: 148 MMHG | WEIGHT: 221 LBS | OXYGEN SATURATION: 98 % | HEIGHT: 63 IN | RESPIRATION RATE: 16 BRPM | BODY MASS INDEX: 39.16 KG/M2 | HEART RATE: 78 BPM | DIASTOLIC BLOOD PRESSURE: 54 MMHG

## 2021-05-16 VITALS
RESPIRATION RATE: 18 BRPM | OXYGEN SATURATION: 97 % | BODY MASS INDEX: 38.8 KG/M2 | SYSTOLIC BLOOD PRESSURE: 128 MMHG | DIASTOLIC BLOOD PRESSURE: 43 MMHG | HEART RATE: 64 BPM | HEIGHT: 63 IN | WEIGHT: 219 LBS

## 2021-05-16 VITALS — RESPIRATION RATE: 16 BRPM | WEIGHT: 220.31 LBS | BODY MASS INDEX: 39.04 KG/M2 | HEIGHT: 63 IN

## 2021-05-16 VITALS
DIASTOLIC BLOOD PRESSURE: 55 MMHG | HEART RATE: 59 BPM | WEIGHT: 222 LBS | RESPIRATION RATE: 20 BRPM | BODY MASS INDEX: 39.34 KG/M2 | OXYGEN SATURATION: 98 % | HEIGHT: 63 IN | SYSTOLIC BLOOD PRESSURE: 139 MMHG | TEMPERATURE: 97.6 F

## 2021-05-16 VITALS
BODY MASS INDEX: 38.62 KG/M2 | TEMPERATURE: 96.7 F | OXYGEN SATURATION: 97 % | HEIGHT: 63 IN | WEIGHT: 218 LBS | HEART RATE: 58 BPM | RESPIRATION RATE: 16 BRPM | SYSTOLIC BLOOD PRESSURE: 147 MMHG | DIASTOLIC BLOOD PRESSURE: 51 MMHG

## 2021-05-16 VITALS — HEIGHT: 63 IN | BODY MASS INDEX: 38.98 KG/M2 | RESPIRATION RATE: 14 BRPM | WEIGHT: 220 LBS

## 2021-05-16 VITALS — BODY MASS INDEX: 39.16 KG/M2 | HEIGHT: 63 IN | WEIGHT: 221 LBS | RESPIRATION RATE: 14 BRPM
